# Patient Record
Sex: FEMALE | Race: WHITE | NOT HISPANIC OR LATINO | Employment: FULL TIME | URBAN - METROPOLITAN AREA
[De-identification: names, ages, dates, MRNs, and addresses within clinical notes are randomized per-mention and may not be internally consistent; named-entity substitution may affect disease eponyms.]

---

## 2019-03-01 LAB
PAP RECOMMENDATION EXT: NORMAL
PAP SMEAR: NORMAL

## 2021-02-09 LAB
CHOLEST SERPL-MSCNC: 181 MG/DL (ref 0–200)
HDLC SERPL-MCNC: 64 MG/DL
LDLC SERPL CALC-MCNC: 91 MG/DL
TRIGL SERPL-MCNC: 130 MG/DL

## 2021-10-27 LAB — HIV-1 ANTIBODY: NON REACTIVE

## 2022-07-26 LAB — CHLAMYDIA TRACHOMATIS AMP DNA: NEGATIVE

## 2023-05-03 ENCOUNTER — OFFICE VISIT (OUTPATIENT)
Dept: PRIMARY CARE CLINIC | Facility: CLINIC | Age: 28
End: 2023-05-03
Payer: COMMERCIAL

## 2023-05-03 VITALS
OXYGEN SATURATION: 100 % | SYSTOLIC BLOOD PRESSURE: 128 MMHG | RESPIRATION RATE: 18 BRPM | BODY MASS INDEX: 28.72 KG/M2 | WEIGHT: 183 LBS | HEART RATE: 66 BPM | DIASTOLIC BLOOD PRESSURE: 88 MMHG | HEIGHT: 67 IN | TEMPERATURE: 98 F

## 2023-05-03 DIAGNOSIS — G43.101 MIGRAINE WITH AURA AND WITH STATUS MIGRAINOSUS, NOT INTRACTABLE: ICD-10-CM

## 2023-05-03 DIAGNOSIS — Z00.00 ENCOUNTER FOR MEDICAL EXAMINATION TO ESTABLISH CARE: Primary | ICD-10-CM

## 2023-05-03 DIAGNOSIS — Z12.4 ENCOUNTER FOR SCREENING FOR CERVICAL CANCER: ICD-10-CM

## 2023-05-03 DIAGNOSIS — F33.2 SEVERE EPISODE OF RECURRENT MAJOR DEPRESSIVE DISORDER, WITHOUT PSYCHOTIC FEATURES: ICD-10-CM

## 2023-05-03 PROCEDURE — 86803 HEPATITIS C AB TEST: CPT | Performed by: FAMILY MEDICINE

## 2023-05-03 PROCEDURE — 83036 HEMOGLOBIN GLYCOSYLATED A1C: CPT | Performed by: FAMILY MEDICINE

## 2023-05-03 PROCEDURE — 99205 PR OFFICE/OUTPT VISIT, NEW, LEVL V, 60-74 MIN: ICD-10-PCS | Mod: S$GLB,,, | Performed by: FAMILY MEDICINE

## 2023-05-03 PROCEDURE — 99205 OFFICE O/P NEW HI 60 MIN: CPT | Mod: S$GLB,,, | Performed by: FAMILY MEDICINE

## 2023-05-03 PROCEDURE — 80053 COMPREHEN METABOLIC PANEL: CPT | Performed by: FAMILY MEDICINE

## 2023-05-03 PROCEDURE — 3079F PR MOST RECENT DIASTOLIC BLOOD PRESSURE 80-89 MM HG: ICD-10-PCS | Mod: CPTII,S$GLB,, | Performed by: FAMILY MEDICINE

## 2023-05-03 PROCEDURE — 3079F DIAST BP 80-89 MM HG: CPT | Mod: CPTII,S$GLB,, | Performed by: FAMILY MEDICINE

## 2023-05-03 PROCEDURE — 3008F BODY MASS INDEX DOCD: CPT | Mod: CPTII,S$GLB,, | Performed by: FAMILY MEDICINE

## 2023-05-03 PROCEDURE — 85025 COMPLETE CBC W/AUTO DIFF WBC: CPT | Performed by: FAMILY MEDICINE

## 2023-05-03 PROCEDURE — 3074F SYST BP LT 130 MM HG: CPT | Mod: CPTII,S$GLB,, | Performed by: FAMILY MEDICINE

## 2023-05-03 PROCEDURE — 3074F PR MOST RECENT SYSTOLIC BLOOD PRESSURE < 130 MM HG: ICD-10-PCS | Mod: CPTII,S$GLB,, | Performed by: FAMILY MEDICINE

## 2023-05-03 PROCEDURE — 3008F PR BODY MASS INDEX (BMI) DOCUMENTED: ICD-10-PCS | Mod: CPTII,S$GLB,, | Performed by: FAMILY MEDICINE

## 2023-05-03 PROCEDURE — 80061 LIPID PANEL: CPT | Performed by: FAMILY MEDICINE

## 2023-05-03 PROCEDURE — 87389 HIV-1 AG W/HIV-1&-2 AB AG IA: CPT | Performed by: FAMILY MEDICINE

## 2023-05-03 RX ORDER — DULOXETIN HYDROCHLORIDE 20 MG/1
20 CAPSULE, DELAYED RELEASE ORAL DAILY
Qty: 30 CAPSULE | Refills: 1 | Status: SHIPPED | OUTPATIENT
Start: 2023-05-03 | End: 2023-05-03

## 2023-05-03 RX ORDER — SUMATRIPTAN SUCCINATE 25 MG/1
25 TABLET ORAL ONCE
Qty: 30 TABLET | Refills: 0 | Status: SHIPPED | OUTPATIENT
Start: 2023-05-03 | End: 2023-05-23

## 2023-05-03 RX ORDER — NORELGESTROMIN AND ETHINYL ESTRADIOL 150; 35 UG/D; UG/D
1 PATCH TRANSDERMAL
COMMUNITY
Start: 2023-05-02

## 2023-05-03 RX ORDER — DULOXETIN HYDROCHLORIDE 20 MG/1
20 CAPSULE, DELAYED RELEASE ORAL DAILY
Qty: 30 CAPSULE | Refills: 1 | Status: SHIPPED | OUTPATIENT
Start: 2023-05-03 | End: 2023-06-02

## 2023-05-03 RX ORDER — IBUPROFEN 800 MG/1
TABLET ORAL
COMMUNITY
Start: 2023-02-08

## 2023-05-03 NOTE — PROGRESS NOTES
Subjective:       Patient ID: Mitra Muse is a 27 y.o. female.    Chief Complaint: Establish Care and Medication Refill    27-year-old female presents to clinic to establish care.  Patient endorses a past medical history of migraines for which she took Maxalt in the past states she was diagnosed with migraines in 5th grade she is not currently taking any medications.  Patient states during pregnancy she was diagnosed with hypertension however after she delivered she no longer needed medication.  Patient states she was diagnosed with part postpartum depression in the past, she took Zoloft and made her feel extremely foggy that she tried role putrid she said it made her feel crazy and like she was having out of body experience, took Seroquel states nothing happened, took trazodone for sleep and had extremely bad nightmares.  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  Patient underwent appendectomy, tonsillectomy, wisdom teeth removal, cholecystectomies, skin graft in her mouth.  Patient states her mother has a history of depression since age of 16, diagnosed with breast cancer at age 36 as well as hypertension and prediabetes.  Patient is known with history of her father.  Patient states she is an only child.  Patient denies any tobacco or illicit drug use.  Currently drinks alcohol approximately once every 2 weeks.  Patient currently works at [employer] at the [employer].    Today, patient is worried about her depression, states that his become more severe lately states she has been dealing with a lot of issues with both of her children her 10-year-old has ODD, ADHD, OCD, anxiety , also lost his father in 2021.  Her 6-year-old also has ODD.  This has been difficult states she knew that she needed help and she began seeing a therapist had [facility] she has attended 2 sessions and is going to   every 2 weeks, she has follow-up next Wednesday.  Patient is concerned because she has extremely high highs extremely low lows, states her mood swings quite often.  She discussed this with the counselor in the diagnosed her with bipolar type 2.  States she has experienced a lot of changes recently with moving here from [place] and her life and has had a difficult time adjusting.  Discussed continuation of therapy as think this is highly beneficial, patient would like to consider starting medication today.  Given the aforementioned drugs we will start Cymbalta and monitor her in approximately 4 weeks.  Patient endorses a good support system at home, states she does have access to, she does not know how to use it.  Patient states her children her the reason for her to keep on living.  Does not have a suicidal plan.    Patient also complains of migraines, states this week she has experienced 3 migraines, in the last month she has had approximately 6 migraines.  She experiences tunnel vision has major light sensitivity and has nausea and vomiting, this past weekend she vomited due to migraine.  Patient states now she just has a dull headache.  Patient is taking ibuprofen with minimal relief.  Discussed with her I would like to attempt a trial of sumatriptan with NSAIDs for relief and evaluate her in approximately 1 month.  Patient states she is aware of of foods that trigger migraines, and she avoids these.    Patient also endorses she is very unhappy about her weight, states she works out at the gym 3 days a week doing cardio lifting she states she walked watches her diet but she feels like she cannot keep the weight off.  Discussed with the not prescribe weight loss medications however, she is implementing very proper healthy tools for diet and exercise.  Administer handout regarding this information.      Patient previously had a Pap smear by Dr. Mehrdad Bocanegra in March 2023, she would like to be referral to another  OBGYN today.  I will place a referral.        Current Outpatient Medications:     ibuprofen (ADVIL,MOTRIN) 800 MG tablet, Take by mouth., Disp: , Rfl:     XULANE 150-35 mcg/24 hr, 1 patch every 7 days., Disp: , Rfl:     DULoxetine (CYMBALTA) 20 MG capsule, Take 1 capsule (20 mg total) by mouth once daily., Disp: 30 capsule, Rfl: 1    sumatriptan (IMITREX) 25 MG Tab, Take 1 tablet (25 mg total) by mouth once. May repeat dose 1 time after two hours. Do not exceed 200mg in 24 hours. for 1 dose, Disp: 30 tablet, Rfl: 0    Review of patient's allergies indicates:   Allergen Reactions    Erythromycin base Hives    Escitalopram      Other reaction(s): Other (See Comments)  Grogginess, shaking    Clarithromycin Hives     biaxin      Codeine Nausea And Vomiting    Erythromycin Hives    Hydrocodone-acetaminophen Nausea And Vomiting    Latex Itching and Rash       History reviewed. No pertinent past medical history.    Past Surgical History:   Procedure Laterality Date    ADENOIDECTOMY      APPENDECTOMY      CHOLECYSTECTOMY      FRACTURE SURGERY      TONSILLECTOMY         Family History   Problem Relation Age of Onset    Breast cancer Mother        Social History     Tobacco Use    Smoking status: Never     Passive exposure: Never    Smokeless tobacco: Never   Substance Use Topics    Alcohol use: Yes    Drug use: Never       Review of Systems   Constitutional:  Negative for activity change, appetite change, fatigue and fever.        Difficulty with weight loss   HENT:  Negative for ear discharge, ear pain, hearing loss and tinnitus.    Eyes:  Negative for photophobia, pain and visual disturbance.   Respiratory:  Negative for cough, shortness of breath and wheezing.    Cardiovascular:  Negative for chest pain and palpitations.   Gastrointestinal:  Negative for abdominal pain, blood in stool, constipation, diarrhea, nausea and vomiting.   Genitourinary:  Negative for dysuria and hematuria.   Neurological:  Positive for  "headaches. Negative for weakness.   Psychiatric/Behavioral:  Positive for dysphoric mood.        Current Medications:   Home Psychiatric Meds:   Psychotherapeutics (From admission, onward)      None                Objective:      Vitals:    05/03/23 1518   BP: 128/88   BP Location: Left arm   Patient Position: Sitting   BP Method: Medium (Manual)   Pulse: 66   Resp: 18   Temp: 98.3 °F (36.8 °C)   TempSrc: Oral   SpO2: 100%   Weight: 83 kg (183 lb)   Height: 5' 7" (1.702 m)     Physical Exam  Constitutional:       Appearance: Normal appearance.   HENT:      Head: Normocephalic.      Right Ear: Tympanic membrane, ear canal and external ear normal.      Left Ear: Tympanic membrane, ear canal and external ear normal.      Nose: Nose normal.      Mouth/Throat:      Mouth: Mucous membranes are moist.   Eyes:      Pupils: Pupils are equal, round, and reactive to light.   Cardiovascular:      Rate and Rhythm: Normal rate and regular rhythm.      Pulses: Normal pulses.   Pulmonary:      Effort: Pulmonary effort is normal.      Breath sounds: Normal breath sounds.   Abdominal:      General: Bowel sounds are normal.      Palpations: Abdomen is soft.   Musculoskeletal:         General: Normal range of motion.      Cervical back: Normal range of motion.   Skin:     General: Skin is warm and dry.   Neurological:      General: No focal deficit present.      Mental Status: She is alert and oriented to person, place, and time.      Cranial Nerves: Cranial nerves 2-12 are intact.      Sensory: Sensation is intact.      Motor: Motor function is intact.   Psychiatric:         Attention and Perception: Attention normal.         Mood and Affect: Mood is depressed.         Speech: Speech normal.         Behavior: Behavior normal.         Thought Content: Thought content normal.         Cognition and Memory: Cognition normal.         No results found for: WBC, HGB, HCT, PLT, CHOL, TRIG, HDL, LDLDIRECT, ALT, AST, NA, K, CL, CREATININE, BUN, " CO2, TSH, PSA, INR, GLUF, HGBA1C, MICROALBUR   Assessment:       1. Encounter for medical examination to establish care    2. Severe episode of recurrent major depressive disorder, without psychotic features    3. Encounter for screening for cervical cancer    4. Migraine with aura and with status migrainosus, not intractable        Plan:         Problem List Items Addressed This Visit          Neuro    Migraine with aura and with status migrainosus, not intractable     - discussed combining Triptan with NSAID naproxen, re-evaluate at next appointment in approximately 1 month, proven efficacy of these drugs in combination           Relevant Medications    sumatriptan (IMITREX) 25 MG Tab       Psychiatric    Severe episode of recurrent major depressive disorder, without psychotic features     - PHQ-9 performed today with score of 24 indicating severe depression  - check continue therapy at [facility], states she has an appointment in 2 weeks  - will begin trial of Cymbalta 20 mg p.o. q.day, consider increasing to b.i.d. and next appointment  - discussed with patient and takes approximately 4-6 weeks to reach full effect of medication  - patient to notify office if any side effects arise, discussed will be out of office next week however my colleagues will be checking messages  - patient to notify office if any symptoms worsen  - discussed carving on time for herself each day, finding a hobby, something that brings her jael           Relevant Medications    DULoxetine (CYMBALTA) 20 MG capsule       Renal/    Encounter for screening for cervical cancer     - OBGYN referral to establish care in the area           Relevant Orders    Ambulatory referral/consult to Obstetrics / Gynecology       Other    Encounter for medical examination to establish care - Primary     - will notify patient of lab results via portal           Relevant Orders    CBC Auto Differential    Comprehensive Metabolic Panel     Hemoglobin A1C    Hepatitis C Antibody    HIV 1/2 Ag/Ab (4th Gen)    Lipid Panel         Follow up in about 1 month (around 6/3/2023), or if symptoms worsen or fail to improve.        Approximately 65 minutes were spent on this encounter. This includes face to face time and non-face to face time preparing to see the patient (eg, review of tests), obtaining and/or reviewing separately obtained history, documenting clinical information in the electronic or other health record, independently interpreting results and communicating results to the patient/family/caregiver, or care coordinator.    Instructed patient that if symptoms fail to improve or worsen patient should seek immediate medical attention or report to the nearest emergency department. Patient expressed verbal agreement and understanding to this plan of care.     This note was created using [employer] voice recognition software that occasionally misinterpreted phrases or words.     ROMEL Garcia MD

## 2023-05-03 NOTE — ASSESSMENT & PLAN NOTE
- PHQ-9 performed today with score of 24 indicating severe depression  - check continue therapy at [facility], states she has an appointment in 2 weeks  - will begin trial of Cymbalta 20 mg p.o. q.day, consider increasing to b.i.d. and next appointment  - discussed with patient and takes approximately 4-6 weeks to reach full effect of medication  - patient to notify office if any side effects arise, discussed will be out of office next week however my colleagues will be checking messages  - patient to notify office if any symptoms worsen  - discussed carving on time for herself each day, finding a hobby, something that brings her jael

## 2023-05-03 NOTE — Clinical Note
Patient previously had a Pap smear by Dr. Mehrdad Bocanegra in March 2023, she would like to be referral to another OBGYN today.  I will place a referral.

## 2023-05-03 NOTE — ASSESSMENT & PLAN NOTE
- discussed combining Triptan with NSAID naproxen, re-evaluate at next appointment in approximately 1 month, proven efficacy of these drugs in combination

## 2023-05-04 ENCOUNTER — PATIENT OUTREACH (OUTPATIENT)
Dept: ADMINISTRATIVE | Facility: HOSPITAL | Age: 28
End: 2023-05-04
Payer: COMMERCIAL

## 2023-05-04 ENCOUNTER — PATIENT MESSAGE (OUTPATIENT)
Dept: PRIMARY CARE CLINIC | Facility: CLINIC | Age: 28
End: 2023-05-04
Payer: COMMERCIAL

## 2023-05-04 LAB
ALBUMIN SERPL BCP-MCNC: 4.3 G/DL (ref 3.5–5.2)
ALP SERPL-CCNC: 57 U/L (ref 55–135)
ALT SERPL W/O P-5'-P-CCNC: 18 U/L (ref 10–44)
ANION GAP SERPL CALC-SCNC: 11 MMOL/L (ref 8–16)
AST SERPL-CCNC: 17 U/L (ref 10–40)
BASOPHILS # BLD AUTO: 0.06 K/UL (ref 0–0.2)
BASOPHILS NFR BLD: 0.7 % (ref 0–1.9)
BILIRUB SERPL-MCNC: 0.1 MG/DL (ref 0.1–1)
BUN SERPL-MCNC: 18 MG/DL (ref 6–20)
CALCIUM SERPL-MCNC: 9.8 MG/DL (ref 8.7–10.5)
CHLORIDE SERPL-SCNC: 104 MMOL/L (ref 95–110)
CHOLEST SERPL-MCNC: 222 MG/DL (ref 120–199)
CHOLEST/HDLC SERPL: 3.2 {RATIO} (ref 2–5)
CO2 SERPL-SCNC: 24 MMOL/L (ref 23–29)
CREAT SERPL-MCNC: 0.8 MG/DL (ref 0.5–1.4)
DIFFERENTIAL METHOD: ABNORMAL
EOSINOPHIL # BLD AUTO: 0.1 K/UL (ref 0–0.5)
EOSINOPHIL NFR BLD: 1.7 % (ref 0–8)
ERYTHROCYTE [DISTWIDTH] IN BLOOD BY AUTOMATED COUNT: 12.5 % (ref 11.5–14.5)
EST. GFR  (NO RACE VARIABLE): >60 ML/MIN/1.73 M^2
ESTIMATED AVG GLUCOSE: 82 MG/DL (ref 68–131)
GLUCOSE SERPL-MCNC: 82 MG/DL (ref 70–110)
HBA1C MFR BLD: 4.5 % (ref 4–5.6)
HCT VFR BLD AUTO: 40.2 % (ref 37–48.5)
HDLC SERPL-MCNC: 70 MG/DL (ref 40–75)
HDLC SERPL: 31.5 % (ref 20–50)
HGB BLD-MCNC: 13.5 G/DL (ref 12–16)
IMM GRANULOCYTES # BLD AUTO: 0.07 K/UL (ref 0–0.04)
IMM GRANULOCYTES NFR BLD AUTO: 0.9 % (ref 0–0.5)
LDLC SERPL CALC-MCNC: 133.8 MG/DL (ref 63–159)
LYMPHOCYTES # BLD AUTO: 3.6 K/UL (ref 1–4.8)
LYMPHOCYTES NFR BLD: 43.8 % (ref 18–48)
MCH RBC QN AUTO: 27.9 PG (ref 27–31)
MCHC RBC AUTO-ENTMCNC: 33.6 G/DL (ref 32–36)
MCV RBC AUTO: 83 FL (ref 82–98)
MONOCYTES # BLD AUTO: 0.6 K/UL (ref 0.3–1)
MONOCYTES NFR BLD: 7.2 % (ref 4–15)
NEUTROPHILS # BLD AUTO: 3.7 K/UL (ref 1.8–7.7)
NEUTROPHILS NFR BLD: 45.7 % (ref 38–73)
NONHDLC SERPL-MCNC: 152 MG/DL
NRBC BLD-RTO: 0 /100 WBC
PLATELET # BLD AUTO: 374 K/UL (ref 150–450)
PMV BLD AUTO: 9.7 FL (ref 9.2–12.9)
POTASSIUM SERPL-SCNC: 4.4 MMOL/L (ref 3.5–5.1)
PROT SERPL-MCNC: 7.9 G/DL (ref 6–8.4)
RBC # BLD AUTO: 4.84 M/UL (ref 4–5.4)
SODIUM SERPL-SCNC: 139 MMOL/L (ref 136–145)
TRIGL SERPL-MCNC: 91 MG/DL (ref 30–150)
WBC # BLD AUTO: 8.11 K/UL (ref 3.9–12.7)

## 2023-05-04 NOTE — PROGRESS NOTES
Records Received, hyper-linked into chart at this time. The following record(s)  below were uploaded for Health Maintenance .    2019     PAP SMEAR    07/2022  CHLAMYDIA SCREENING      2021  LIPID PANEL      2021  HIV SCREENING

## 2023-05-05 LAB
HCV AB SERPL QL IA: NORMAL
HIV 1+2 AB+HIV1 P24 AG SERPL QL IA: NORMAL

## 2023-05-16 NOTE — PROGRESS NOTES
Please notify patient of lab results. Her total cholesterol was slightly elevated. This can be remedied with a consistent diet and exercise regimen. We recommend 150 minutes of moderate intensity exercise a week and the Mediterranean diet. Thank you.

## 2023-05-18 ENCOUNTER — PATIENT MESSAGE (OUTPATIENT)
Dept: PRIMARY CARE CLINIC | Facility: CLINIC | Age: 28
End: 2023-05-18
Payer: COMMERCIAL

## 2023-05-23 ENCOUNTER — OFFICE VISIT (OUTPATIENT)
Dept: PRIMARY CARE CLINIC | Facility: CLINIC | Age: 28
End: 2023-05-23
Payer: COMMERCIAL

## 2023-05-23 VITALS
SYSTOLIC BLOOD PRESSURE: 118 MMHG | HEART RATE: 79 BPM | TEMPERATURE: 98 F | WEIGHT: 184.19 LBS | OXYGEN SATURATION: 99 % | RESPIRATION RATE: 18 BRPM | BODY MASS INDEX: 28.91 KG/M2 | DIASTOLIC BLOOD PRESSURE: 78 MMHG | HEIGHT: 67 IN

## 2023-05-23 DIAGNOSIS — R30.0 DYSURIA: ICD-10-CM

## 2023-05-23 DIAGNOSIS — L29.2 VULVOVAGINAL ITCHING: Primary | ICD-10-CM

## 2023-05-23 DIAGNOSIS — F33.2 SEVERE EPISODE OF RECURRENT MAJOR DEPRESSIVE DISORDER, WITHOUT PSYCHOTIC FEATURES: ICD-10-CM

## 2023-05-23 LAB
BILIRUBIN, UA POC OHS: NEGATIVE
BLOOD, UA POC OHS: NEGATIVE
CLARITY, UA POC OHS: CLEAR
COLOR, UA POC OHS: YELLOW
GLUCOSE, UA POC OHS: NEGATIVE
KETONES, UA POC OHS: NEGATIVE
LEUKOCYTES, UA POC OHS: ABNORMAL
NITRITE, UA POC OHS: NEGATIVE
PH, UA POC OHS: 6.5
PROTEIN, UA POC OHS: NEGATIVE
SPECIFIC GRAVITY, UA POC OHS: 1.02
UROBILINOGEN, UA POC OHS: 0.2

## 2023-05-23 PROCEDURE — 3074F PR MOST RECENT SYSTOLIC BLOOD PRESSURE < 130 MM HG: ICD-10-PCS | Mod: CPTII,S$GLB,, | Performed by: FAMILY MEDICINE

## 2023-05-23 PROCEDURE — 3008F PR BODY MASS INDEX (BMI) DOCUMENTED: ICD-10-PCS | Mod: CPTII,S$GLB,, | Performed by: FAMILY MEDICINE

## 2023-05-23 PROCEDURE — 3074F SYST BP LT 130 MM HG: CPT | Mod: CPTII,S$GLB,, | Performed by: FAMILY MEDICINE

## 2023-05-23 PROCEDURE — 81003 URINALYSIS AUTO W/O SCOPE: CPT | Mod: QW,S$GLB,, | Performed by: FAMILY MEDICINE

## 2023-05-23 PROCEDURE — 99213 PR OFFICE/OUTPT VISIT, EST, LEVL III, 20-29 MIN: ICD-10-PCS | Mod: S$GLB,,, | Performed by: FAMILY MEDICINE

## 2023-05-23 PROCEDURE — 3008F BODY MASS INDEX DOCD: CPT | Mod: CPTII,S$GLB,, | Performed by: FAMILY MEDICINE

## 2023-05-23 PROCEDURE — 3044F PR MOST RECENT HEMOGLOBIN A1C LEVEL <7.0%: ICD-10-PCS | Mod: CPTII,S$GLB,, | Performed by: FAMILY MEDICINE

## 2023-05-23 PROCEDURE — 3078F PR MOST RECENT DIASTOLIC BLOOD PRESSURE < 80 MM HG: ICD-10-PCS | Mod: CPTII,S$GLB,, | Performed by: FAMILY MEDICINE

## 2023-05-23 PROCEDURE — 3078F DIAST BP <80 MM HG: CPT | Mod: CPTII,S$GLB,, | Performed by: FAMILY MEDICINE

## 2023-05-23 PROCEDURE — 99213 OFFICE O/P EST LOW 20 MIN: CPT | Mod: S$GLB,,, | Performed by: FAMILY MEDICINE

## 2023-05-23 PROCEDURE — 3044F HG A1C LEVEL LT 7.0%: CPT | Mod: CPTII,S$GLB,, | Performed by: FAMILY MEDICINE

## 2023-05-23 PROCEDURE — 81003 POCT URINALYSIS(INSTRUMENT): ICD-10-PCS | Mod: QW,S$GLB,, | Performed by: FAMILY MEDICINE

## 2023-05-23 RX ORDER — FLUCONAZOLE 150 MG/1
TABLET ORAL
Qty: 2 TABLET | Refills: 0 | Status: SHIPPED | OUTPATIENT
Start: 2023-05-23

## 2023-05-23 RX ORDER — AMOXICILLIN 500 MG/1
500 CAPSULE ORAL EVERY 12 HOURS
COMMUNITY
Start: 2023-05-16

## 2023-05-23 NOTE — PROGRESS NOTES
Subjective:       Patient ID: Mitra Muse is a 27 y.o. female.    Chief Complaint: Vaginal Itching (2 days)    27 year old female presents to clinic today complaining vaginal discharge, itching, burning for approximately two days.  Denies odor states there is no thick discharge.  Patient states last Tuesday she tested positive for strep at the urgent care and was administered amoxicillin, she returned to the urgent care on Thursday stating she was not any better, and they diagnosed her with a secondary viral illness. Patient endorses her symptoms have improved and they are getting better each day. Encouraged her to complete the entire course of antibiotics, and maintain oral hydration. States since she began taking the amoxicillin she has had vaginal discharge and itching.  Discussed I would like to perform UA today to rule out any underlying infection.  Patient also states her depression has gotten a little better since last appointment but no significant improvement, she had a counseling session with Lino at [facility].  PHQ-9 was performed today with improvement to 16 from 24.  Discussed with her I would like her to continue the Cymbalta and follow up in 1 more month.  Discussed will perform urine dipstick today she is in agreement to this plan of care.  No further complaints noted today.    Discuss follow up with OBGYN, patient states she has appointment scheduled on the 5th of June to undergo Pap smear.          Current Outpatient Medications:     amoxicillin (AMOXIL) 500 MG capsule, Take 500 mg by mouth every 12 (twelve) hours., Disp: , Rfl:     DULoxetine (CYMBALTA) 20 MG capsule, Take 1 capsule (20 mg total) by mouth once daily., Disp: 30 capsule, Rfl: 1    ibuprofen (ADVIL,MOTRIN) 800 MG tablet, Take by mouth., Disp: , Rfl:     sumatriptan (IMITREX) 25 MG Tab, Take 1 tablet (25 mg total) by mouth once. May repeat dose 1 time after two hours. Do not exceed 200mg in 24 hours. for 1 dose,  Disp: 30 tablet, Rfl: 0    XULANE 150-35 mcg/24 hr, 1 patch every 7 days., Disp: , Rfl:     fluconazole (DIFLUCAN) 150 MG Tab, Take one tablet by mouth, then take second tablet 72 hours later if symptoms persist., Disp: 2 tablet, Rfl: 0    Review of patient's allergies indicates:   Allergen Reactions    Erythromycin base Hives    Escitalopram      Other reaction(s): Other (See Comments)  Grogginess, shaking    Clarithromycin Hives     biaxin      Codeine Nausea And Vomiting    Erythromycin Hives    Hydrocodone-acetaminophen Nausea And Vomiting    Latex Itching and Rash       No past medical history on file.    Past Surgical History:   Procedure Laterality Date    ADENOIDECTOMY      APPENDECTOMY      CHOLECYSTECTOMY      FRACTURE SURGERY      TONSILLECTOMY         Family History   Problem Relation Age of Onset    Breast cancer Mother        Social History     Tobacco Use    Smoking status: Never     Passive exposure: Never    Smokeless tobacco: Never   Substance Use Topics    Alcohol use: Yes    Drug use: Never       Review of Systems   Constitutional:  Negative for activity change, appetite change, fatigue, fever and unexpected weight change.   HENT:  Negative for ear discharge, ear pain, hearing loss and tinnitus.    Eyes:  Negative for photophobia, pain and visual disturbance.   Respiratory:  Negative for cough, shortness of breath and wheezing.    Cardiovascular:  Negative for chest pain and palpitations.   Gastrointestinal:  Negative for abdominal pain, blood in stool, constipation, diarrhea, nausea and vomiting.   Genitourinary:  Positive for dysuria and vaginal discharge. Negative for hematuria.        Vaginal itching   Neurological:  Negative for weakness and headaches.       Current Medications:   Home Psychiatric Meds:   Psychotherapeutics (From admission, onward)      None                Objective:      Vitals:    05/23/23 0721   BP: 118/78   BP Location: Left arm   Patient Position: Sitting   BP Method:  "Medium (Manual)   Pulse: 79   Resp: 18   Temp: 97.7 °F (36.5 °C)   TempSrc: Oral   SpO2: 99%   Weight: 83.6 kg (184 lb 3.2 oz)   Height: 5' 7" (1.702 m)     Physical Exam  Vitals reviewed.   Constitutional:       Appearance: Normal appearance.   HENT:      Head: Normocephalic.      Right Ear: Tympanic membrane, ear canal and external ear normal.      Left Ear: Tympanic membrane, ear canal and external ear normal.      Nose: Nose normal.      Mouth/Throat:      Mouth: Mucous membranes are moist.   Eyes:      Pupils: Pupils are equal, round, and reactive to light.   Cardiovascular:      Rate and Rhythm: Normal rate and regular rhythm.      Pulses: Normal pulses.      Heart sounds: Normal heart sounds.   Pulmonary:      Effort: Pulmonary effort is normal.      Breath sounds: Normal breath sounds.   Abdominal:      General: Bowel sounds are normal.      Palpations: Abdomen is soft.   Musculoskeletal:         General: Normal range of motion.      Cervical back: Normal range of motion and neck supple.   Skin:     General: Skin is warm and dry.   Neurological:      General: No focal deficit present.      Mental Status: She is alert and oriented to person, place, and time.   Psychiatric:         Mood and Affect: Mood normal.         Behavior: Behavior normal.         Lab Results   Component Value Date    WBC 8.11 05/03/2023    HGB 13.5 05/03/2023    HCT 40.2 05/03/2023     05/03/2023    CHOL 222 (H) 05/03/2023    TRIG 91 05/03/2023    HDL 70 05/03/2023    ALT 18 05/03/2023    AST 17 05/03/2023     05/03/2023    K 4.4 05/03/2023     05/03/2023    CREATININE 0.8 05/03/2023    BUN 18 05/03/2023    CO2 24 05/03/2023    HGBA1C 4.5 05/03/2023      Assessment:       1. Vulvovaginal itching    2. Dysuria    3. Severe episode of recurrent major depressive disorder, without psychotic features        Plan:         Problem List Items Addressed This Visit          Psychiatric    Severe episode of recurrent major " depressive disorder, without psychotic features     - continue Cymbalta, patient's PHQ-9 has improved today with a score of 16 decreased from score of 24  - would like to re-evaluate patient in approximately 1 month  - continue counseling at [facility]    Vulvovaginal itching - Primary     - presume recent antibiotics to be the underlying cause of symptoms  - encouraged patient to follow-up with OBGYN, appointment on June 6th  - diflucan prescribed, patient should return to clinic if symptoms worsen or become severe           Relevant Medications    fluconazole (DIFLUCAN) 150 MG Tab       Renal/    Dysuria     - urinalysis performed today, notified patient of results before she left the office         Relevant Orders    POCT Urinalysis(Instrument) (Completed)         Follow up in about 4 weeks (around 6/20/2023), or if symptoms worsen or fail to improve.         Approximately 25 minutes were spent on this encounter. This includes face to face time and non-face to face time preparing to see the patient (eg, review of tests), obtaining and/or reviewing separately obtained history, documenting clinical information in the electronic or other health record, independently interpreting results and communicating results to the patient/family/caregiver, or care coordinator.    Instructed patient that if symptoms fail to improve or worsen patient should seek immediate medical attention or report to the nearest emergency department. Patient expressed verbal agreement and understanding to this plan of care.     This note was created using [employer] voice recognition software that occasionally misinterpreted phrases or words.     ROMEL Garcia MD

## 2023-05-23 NOTE — ASSESSMENT & PLAN NOTE
- continue Cymbalta, patient's PHQ-9 has improved today with a score of 16 decreased from score of 24  - would like to re-evaluate patient in approximately 1 month  - continue counseling at [facility]

## 2023-05-23 NOTE — ASSESSMENT & PLAN NOTE
- presume recent antibiotics to be the underlying cause of symptoms  - encouraged patient to follow-up with OBGYN, appointment on June 6th  - diflucan prescribed, patient should return to clinic if symptoms worsen or become severe

## 2023-08-07 PROBLEM — Z00.00 ENCOUNTER FOR MEDICAL EXAMINATION TO ESTABLISH CARE: Status: RESOLVED | Noted: 2023-05-03 | Resolved: 2023-08-07
